# Patient Record
Sex: FEMALE | Race: WHITE | HISPANIC OR LATINO | Employment: FULL TIME | URBAN - METROPOLITAN AREA
[De-identification: names, ages, dates, MRNs, and addresses within clinical notes are randomized per-mention and may not be internally consistent; named-entity substitution may affect disease eponyms.]

---

## 2023-08-09 ENCOUNTER — OFFICE VISIT (OUTPATIENT)
Dept: FAMILY MEDICINE | Facility: CLINIC | Age: 23
End: 2023-08-09
Payer: COMMERCIAL

## 2023-08-09 VITALS
WEIGHT: 143 LBS | DIASTOLIC BLOOD PRESSURE: 82 MMHG | RESPIRATION RATE: 16 BRPM | TEMPERATURE: 98.2 F | SYSTOLIC BLOOD PRESSURE: 110 MMHG | BODY MASS INDEX: 24.41 KG/M2 | HEIGHT: 64 IN

## 2023-08-09 DIAGNOSIS — H11.31 SUBCONJUNCTIVAL HEMORRHAGE OF RIGHT EYE: Primary | ICD-10-CM

## 2023-08-09 DIAGNOSIS — H01.001 BLEPHARITIS OF RIGHT UPPER EYELID, UNSPECIFIED TYPE: ICD-10-CM

## 2023-08-09 PROCEDURE — 99203 OFFICE O/P NEW LOW 30 MIN: CPT | Performed by: NURSE PRACTITIONER

## 2023-08-09 RX ORDER — OFLOXACIN 3 MG/ML
2 SOLUTION/ DROPS OPHTHALMIC EVERY 6 HOURS
COMMUNITY
Start: 2023-08-07

## 2023-08-09 ASSESSMENT — ENCOUNTER SYMPTOMS: EYE PAIN: 1

## 2023-08-09 NOTE — PROGRESS NOTES
"  Assessment & Plan     Subconjunctival hemorrhage of right eye  Blepharitis of right upper eyelid, unspecified type  Right eye consistent with subconjunctival hemorrhage.  Unclear what had caused this.  Possibly in her sleep.  Discussed with patient this may get slightly worse before it gets better including yellowing and turning purple.  If not significantly improving or if getting a lot of pain I would recommend follow-up with an eye doctor..    The upper eyelid swelling is improving. I would continue the ofloxacin drops for infection.                 PANDA KUHN CNP  [facility]    Luisito Jon is a 23 year old, presenting for the following health issues:  Eye Problem        8/9/2023     8:45 AM   Additional Questions   Roomed by Traci CHRIS       History of Present Illness       Reason for visit:  Right eye red  Symptom onset:  Today  Symptoms include:  Itchy, watery eye,swollen  Symptom intensity:  Mild  Symptom progression:  Worsening  Had these symptoms before:  No  What makes it worse:  No  What makes it better:  No    She eats 2-3 servings of fruits and vegetables daily.She consumes 2 sweetened beverage(s) daily.She exercises with enough effort to increase her heart rate 20 to 29 minutes per day.  She exercises with enough effort to increase her heart rate 3 or less days per week.   She is taking medications regularly.               Review of Systems   Eyes:  Positive for pain.            Objective    /82 (BP Location: Right arm, Patient Position: Sitting, Cuff Size: Adult Regular)   Temp 98.2  F (36.8  C) (Oral)   Resp 16   Ht 1.613 m (5' 3.5\")   Wt 64.9 kg (143 lb)   LMP 07/13/2023 (Exact Date)   BMI 24.93 kg/m    Body mass index is 24.93 kg/m .  Physical Exam  Constitutional:       Appearance: Normal appearance.   Eyes:      Conjunctiva/sclera:      Right eye: Hemorrhage present.      Comments: Right upper eyelid swelling. Minimal amount of " ecchymosis at external corner of upper eye. No hordeolum/stye.    Neurological:      General: No focal deficit present.      Mental Status: She is alert and oriented to person, place, and time.   Psychiatric:         Mood and Affect: Mood normal.         Behavior: Behavior normal.

## 2024-06-04 NOTE — PROGRESS NOTES
ASSESSMENT & PLAN    Danay was seen today for pain.    Diagnoses and all orders for this visit:    Chronic pain of right wrist  -     XR Wrist Right G/E 3 Views; Future    Pain of wrist with palpable mass  -     MR Wrist Right w/o Contrast; Future  -     Hand Therapy Referral; Future        - bony prominence at base of second metacarpal without previous or acute trauma causing pain and tenderness with signs of impringement of right wrist. Acute on chronic. Prominence has been there for years however now has exacerbation of pain. Will get MRI to rule out cyst versus bony abnormality versus other acute tendinitis  -Use as tolerated  -If cystic will recommend aspiration and if bony can consider brace and formal hand/OT therapy to help with symptom relief.     Kell Montez [facility]    -----  Chief Complaint   Patient presents with    Right Wrist - Pain       SUBJECTIVE  Danay Ma is a/an 24 year old female who is seen as a self referral for evaluation of right wrist pain.     The patient is seen by themselves.  The patient is Right handed    Onset: acute on chronic bony prominence that because tender 1-2 month(s) ago became symptomatic, however been having bump for years.. Reports insidious onset without acute precipitating event. No known injury  Location of Pain: right wrist dorsally, near capitate  Worsened by: weightbearing, passive extension,   Better with: nothing  Treatments tried: no treatment tried to date  Associated symptoms: aching pain that is manageable. Deformity visualized on dorsal hand.   Denies numbness, tingling, locking  Orthopedic/Surgical history: NO  Social History/Occupation: works  at [employer]    There is no problem list on file for this patient.      Current Outpatient Medications   Medication Sig Dispense Refill    ofloxacin (OCUFLOX) 0.3 % ophthalmic solution Place 2 drops into the right eye every 6 hours         PMH,  Medications and Allergies were reviewed and updated as needed.    REVIEW OF SYSTEMS:  10 point ROS is negative other than symptoms noted above in HPI        OBJECTIVE:  /78   Wt 58.5 kg (129 lb)   BMI 22.49 kg/m     General: healthy, alert and in no distress  Skin: no suspicious lesions or rash.  CV: distal perfusion intact RUE  Resp: normal respiratory effort without conversational dyspnea   Psych: normal mood and affect  Gait: NORMAL  Neuro: Normal light sensory exam of right upper extremity     MSK:  RIGHT WRIST  Inspection:    Notable prominence based of second and third metacapral  Palpation:    Tender about the second metacarpal base at site of prominence. Remainder of bony and ligamentous line marks are nontender.   Range of Motion:    Restricted wrist extension   Strength:   No deficits in flexion, extension, ulnar/radial deviation, or  strength.. Normal pinch strength.  Special Tests:    Positive: None    Negative: CMC grind.         RADIOLOGY:  Final results and radiologist's interpretation, available in the [facility] health record.  Images were reviewed with the patient in the office today.    My personal interpretation of the performed imaging:     XR wrist right: 6/10/2024:  no acute fracture or dislocation. Await final radiology read                     Disclaimer: This note consists of symbols derived from keyboarding, dictation and/or voice recognition software. As a result, there may be errors in the script that have gone undetected. Please consider this when interpreting information found in this chart.

## 2024-06-10 ENCOUNTER — OFFICE VISIT (OUTPATIENT)
Dept: ORTHOPEDICS | Facility: CLINIC | Age: 24
End: 2024-06-10
Payer: COMMERCIAL

## 2024-06-10 ENCOUNTER — ANCILLARY PROCEDURE (OUTPATIENT)
Dept: GENERAL RADIOLOGY | Facility: CLINIC | Age: 24
End: 2024-06-10
Attending: STUDENT IN AN ORGANIZED HEALTH CARE EDUCATION/TRAINING PROGRAM
Payer: COMMERCIAL

## 2024-06-10 VITALS — WEIGHT: 129 LBS | SYSTOLIC BLOOD PRESSURE: 112 MMHG | BODY MASS INDEX: 22.49 KG/M2 | DIASTOLIC BLOOD PRESSURE: 78 MMHG

## 2024-06-10 DIAGNOSIS — G89.29 CHRONIC PAIN OF RIGHT WRIST: ICD-10-CM

## 2024-06-10 DIAGNOSIS — M25.531 CHRONIC PAIN OF RIGHT WRIST: ICD-10-CM

## 2024-06-10 DIAGNOSIS — M25.531 CHRONIC PAIN OF RIGHT WRIST: Primary | ICD-10-CM

## 2024-06-10 DIAGNOSIS — G89.29 CHRONIC PAIN OF RIGHT WRIST: Primary | ICD-10-CM

## 2024-06-10 DIAGNOSIS — M25.539 PAIN OF WRIST WITH PALPABLE MASS: ICD-10-CM

## 2024-06-10 DIAGNOSIS — R22.30 PAIN OF WRIST WITH PALPABLE MASS: ICD-10-CM

## 2024-06-10 PROCEDURE — 99203 OFFICE O/P NEW LOW 30 MIN: CPT | Performed by: STUDENT IN AN ORGANIZED HEALTH CARE EDUCATION/TRAINING PROGRAM

## 2024-06-10 PROCEDURE — 73110 X-RAY EXAM OF WRIST: CPT | Mod: TC | Performed by: RADIOLOGY

## 2024-06-10 NOTE — PATIENT INSTRUCTIONS
1. Chronic pain of right wrist      - hardened prominence without acute trauma causing pain and tenderness with signs of impringement of right wrist. Acute on chronic. Prominence has been there for years however now has exacerbation of pain with wrist extension. Will get MRI to rule out cyst versus bony abnormality from previous injury versus other acute tendinitis  -Use as tolerated  -If cystic will recommend aspiration and if bony can consider brace and formal hand/OT therapy to help with symptom relief.     Please call [phone]  Ask for my team if you have any questions or concerns    Kell Montez DO  [facility]      Thank you for choosing [facility]!    CLINIC LOCATIONS:     [place]  TRIAGE LINE: [phone] [address] [facility] [place] APPOINTMENTS: [phone]   [place] [address] RADIOLOGY: [phone]   (Monday, Thursday & Friday) PHYSICAL THERAPY: [phone]    BILLING QUESTIONS: [phone]   [place] FAX: [phone] [address]    [place] [address]    (Wednesday)

## 2024-06-10 NOTE — LETTER
6/10/2024      Danay Ma  [address]  [place] [address]      Dear Colleague,    Thank you for referring your patient, Danay Ma, to the [facility]. Please see a copy of my visit note below.    ASSESSMENT & PLAN    Danay was seen today for pain.    Diagnoses and all orders for this visit:    Chronic pain of right wrist  -     XR Wrist Right G/E 3 Views; Future    Pain of wrist with palpable mass  -     MR Wrist Right w/o Contrast; Future  -     Hand Therapy Referral; Future        - bony prominence at base of second metacarpal without previous or acute trauma causing pain and tenderness with signs of impringement of right wrist. Acute on chronic. Prominence has been there for years however now has exacerbation of pain. Will get MRI to rule out cyst versus bony abnormality versus other acute tendinitis  -Use as tolerated  -If cystic will recommend aspiration and if bony can consider brace and formal hand/OT therapy to help with symptom relief.     Kell Montez DO  [facility]    -----  Chief Complaint   Patient presents with     Right Wrist - Pain       SUBJECTIVE  Danay Ma is a/an 24 year old female who is seen as a self referral for evaluation of right wrist pain.     The patient is seen by themselves.  The patient is Right handed    Onset: acute on chronic bony prominence that because tender 1-2 month(s) ago became symptomatic, however been having bump for years.. Reports insidious onset without acute precipitating event. No known injury  Location of Pain: right wrist dorsally, near capitate  Worsened by: weightbearing, passive extension,   Better with: nothing  Treatments tried: no treatment tried to date  Associated symptoms: aching pain that is manageable. Deformity visualized on dorsal hand.   Denies numbness, tingling, locking  Orthopedic/Surgical history: NO  Social History/Occupation: works   at [employer]    There is no problem list on file for this patient.      Current Outpatient Medications   Medication Sig Dispense Refill     ofloxacin (OCUFLOX) 0.3 % ophthalmic solution Place 2 drops into the right eye every 6 hours         PMH, Medications and Allergies were reviewed and updated as needed.    REVIEW OF SYSTEMS:  10 point ROS is negative other than symptoms noted above in HPI        OBJECTIVE:  /78   Wt 58.5 kg (129 lb)   BMI 22.49 kg/m     General: healthy, alert and in no distress  Skin: no suspicious lesions or rash.  CV: distal perfusion intact RUE  Resp: normal respiratory effort without conversational dyspnea   Psych: normal mood and affect  Gait: NORMAL  Neuro: Normal light sensory exam of right upper extremity     MSK:  RIGHT WRIST  Inspection:    Notable prominence based of second and third metacapral  Palpation:    Tender about the second metacarpal base at site of prominence. Remainder of bony and ligamentous line marks are nontender.   Range of Motion:    Restricted wrist extension   Strength:   No deficits in flexion, extension, ulnar/radial deviation, or  strength.. Normal pinch strength.  Special Tests:    Positive: None    Negative: CMC grind.         RADIOLOGY:  Final results and radiologist's interpretation, available in the [facility] health record.  Images were reviewed with the patient in the office today.    My personal interpretation of the performed imaging:     XR wrist right: 6/10/2024:  no acute fracture or dislocation. Await final radiology read                     Disclaimer: This note consists of symbols derived from keyboarding, dictation and/or voice recognition software. As a result, there may be errors in the script that have gone undetected. Please consider this when interpreting information found in this chart.       Again, thank you for allowing me to participate in the care of your patient.        Sincerely,        Kell Montez, DO

## 2024-06-19 ENCOUNTER — THERAPY VISIT (OUTPATIENT)
Dept: OCCUPATIONAL THERAPY | Facility: REHABILITATION | Age: 24
End: 2024-06-19
Attending: STUDENT IN AN ORGANIZED HEALTH CARE EDUCATION/TRAINING PROGRAM
Payer: COMMERCIAL

## 2024-06-19 DIAGNOSIS — R22.30 PAIN OF WRIST WITH PALPABLE MASS: ICD-10-CM

## 2024-06-19 DIAGNOSIS — M25.531 RIGHT WRIST PAIN: Primary | ICD-10-CM

## 2024-06-19 DIAGNOSIS — M25.539 PAIN OF WRIST WITH PALPABLE MASS: ICD-10-CM

## 2024-06-19 PROCEDURE — 97530 THERAPEUTIC ACTIVITIES: CPT | Mod: GO | Performed by: OCCUPATIONAL THERAPIST

## 2024-06-19 PROCEDURE — 97535 SELF CARE MNGMENT TRAINING: CPT | Mod: GO | Performed by: OCCUPATIONAL THERAPIST

## 2024-06-19 PROCEDURE — 97165 OT EVAL LOW COMPLEX 30 MIN: CPT | Mod: GO | Performed by: OCCUPATIONAL THERAPIST

## 2024-06-19 NOTE — PROGRESS NOTES
"OCCUPATIONAL THERAPY EVALUATION  Type of Visit: Evaluation             Subjective      Presenting condition or subjective complaint: right wrist discomfort  Date of onset: 06/10/24 (Referral)    Relevant medical history: Depression   Dates & types of surgery:      Patient comes to therapy today for evaluation and treatment of right-sided wrist pain.  She complains of a hard nodule/mass to the dorsum of her right hand and wrist that has been present since birth.  It is firm and not necessarily mobile or tender to the touch, though her extensor tendon will roll over it and this has been growing more uncomfortable over time.  She does endorse mild discomfort with heavy weightbearing on the extended wrist, as well.  She only complains of pain at nighttime if she \"sleeps on it wrong.\"  Imaging including ultrasound and x-ray were inconclusive, has MRI scheduled for July 12.  Thus far she has only tried rest with some relief.    Per Dr. Montez 6/10/2024: Bony prominence at base of second metacarpal without previous or acute trauma causing pain and tenderness with signs of impringement of right wrist. Acute on chronic. Prominence has been there for years however now has exacerbation of pain. Will get MRI to rule out cyst versus bony abnormality versus other acute tendinitis  -Use as tolerated  -If cystic will recommend aspiration and if bony can consider brace and formal hand/OT therapy to help with symptom relief.     Prior diagnostic imaging/testing results: MRI; X-ray     Prior therapy history for the same diagnosis, illness or injury: No      Prior Level of Function  Transfers: Independent  Ambulation: Independent  ADL: Independent  IADL: Driving, Finances, Housekeeping, Laundry, Meal preparation, Medication management, Work, Yard work    Living Environment  Social support: With a significant other or spouse   Type of home: Apartment/condo   Stairs to enter the home: No       Ramp: No   Stairs inside the home: No     "   Help at home:    Equipment owned:       Employment:      Hobbies/Interests:      Patient goals for therapy:         Objective   ADDITIONAL HISTORY:  Right hand dominant  Patient reports symptoms of pain, stiffness/loss of motion, and weakness/loss of strength  Transportation: drives  Currently working in normal job without restrictions    Functional Outcome Measure:   Upper Extremity Functional Index Score:  SCORE:   Column Totals: /80: 74   (A lower score indicates greater disability.)    PAIN:  Pain Level at Rest: 0/10  Pain Level with Use: 6/10  Pain Location: Right dorsal wrist, just distal to the lunate/capitate between the second and third metacarpals.   Pain Quality: Aching  Pain Frequency: intermittent  Pain is Worst: daytime  Pain is Exacerbated By: Keyboarding, weightbearing on the extended wrist  Pain is Relieved By: rest  Pain Progression: Unchanged    POSTURE: Normal     EDEMA:  A firm mass is palpable to the dorsum of the right hand, between the second and third metacarpals, just distal to the lunate and capitate. 0.5 cm in diameter. The EDC/EIP are noted to roll over it primarily with active radial/ulnar deviation of the wrist. Does not appear fluid filled and is not trans-luminescent.        SENSATION: WNL throughout all nerve distributions; per patient report     ROM:  AROM of the bilateral elbow, FA, wrist, thumb and digits are WFL and equivocal bilaterally. Mild tenderness at end range flexion and extension to the right.     SPECIAL TESTS:   Central Dorsal Zone Left Right   Finger Extension Test (SL--in wrist flex, resist long ext) Negative Negative   Linscheid Test (CMC joints--stabilize distal MC, mob CMC) Negative Negative   Ballottement Scaphoid on Lunate Negative Negative       RESISTED TESTING:  Non-tender with resisted independent finger extension, composite digital extension, wrist flexion and extension bilaterally.       STRENGTH:  NT today.     PALPATION:  TTP to the mass about the  dorsum of the right hand, otherwise negative.     Assessment & Plan   CLINICAL IMPRESSIONS  Medical Diagnosis: Right wrist pain with palpable mass    Treatment Diagnosis: Right wrist pain with palpable mass, right wrist pain    Impression/Assessment: Pt is a 24 year old female presenting to Occupational Therapy due to right wrist pain.  The following significant findings have been identified: Impaired activity tolerance and Pain.  These identified deficits interfere with their ability to perform self care tasks, work tasks, recreational activities, household chores, driving , and  yard work as compared to previous level of function.   Patient's limitations or Problem List includes: Pain, Decreased ROM/motion, and Tightness in musculature of the right wrist, hand, index finger, and long finger which interferes with the patient's ability to perform Self Care Tasks (dressing), Work Tasks, Sleep Patterns, Recreational Activities, Household Chores, and Driving  as compared to previous level of function.    Clinical Decision Making (Complexity):  Assessment of Occupational Performance: 1-3 Performance Deficits  Occupational Performance Limitations: driving and community mobility, health management and maintenance, home establishment and management, meal preparation and cleanup, sleep, work, leisure activities, and social participation  Clinical Decision Making (Complexity): Low complexity    PLAN OF CARE  Treatment Interventions:  Therapeutic Exercise:  AROM, AAROM, PROM, Tendon Gliding, Blocking, Reverse Blocking, Place and Hold, Contract Relax, Extensor Tracking, Isotonics, Isometrics, and Stabilization  Neuromuscular re-education:  Nerve Gliding, Coordination/Dexterity, Kinesthetic Training, Proprioceptive Training, Posture, Kinesiotaping, Strain Counter Strain, Isometrics, and Stabilization  Manual Techniques:  Coordination/Dexterity, Joint mobilization, Friction massage, Myofascial release, and Manual edema  mobilization  Orthotic Fabrication:  Static, Finger based, Hand based, and Forearm based  Self Care:  Self Care Tasks, Ergonomic Considerations, and Work Tasks    Long Term Goals   OT Goal 1  Goal Identifier: Goal I  Goal Description: Patient will complete routine ADL, IADL and work-related duties with little to no pain and/or difficulty utilizing AE and activity modification principles as needed. (0-1/10)  Rationale: In order to maximize safety and independence with performance of self-care activities;In order to maximize safety and independence with ADL/IADLs  Goal Progress: New  Target Date: 08/14/24      Frequency of Treatment: 1x/month  Duration of Treatment: 2 months     Recommended Referrals to Other Professionals:     Education Assessment: Learner/Method: Patient;No Barriers to Learning;Pictures/Video;Demonstration;Reading;Listening     Risks and benefits of evaluation/treatment have been explained.   Patient/Family/caregiver agrees with Plan of Care.     Evaluation Time:    OT Eval, Low Complexity Minutes (39656): 20    Signing Clinician: Gilberto Arteaga OT

## 2024-07-12 ENCOUNTER — HOSPITAL ENCOUNTER (OUTPATIENT)
Dept: MRI IMAGING | Facility: CLINIC | Age: 24
Discharge: HOME | End: 2024-07-12
Attending: STUDENT IN AN ORGANIZED HEALTH CARE EDUCATION/TRAINING PROGRAM | Admitting: STUDENT IN AN ORGANIZED HEALTH CARE EDUCATION/TRAINING PROGRAM
Payer: COMMERCIAL

## 2024-07-12 DIAGNOSIS — M25.539 PAIN OF WRIST WITH PALPABLE MASS: ICD-10-CM

## 2024-07-12 DIAGNOSIS — R22.30 PAIN OF WRIST WITH PALPABLE MASS: ICD-10-CM

## 2024-07-12 PROCEDURE — 73221 MRI JOINT UPR EXTREM W/O DYE: CPT | Mod: RT

## 2024-07-15 ENCOUNTER — TELEPHONE (OUTPATIENT)
Dept: ORTHOPEDICS | Facility: CLINIC | Age: 24
End: 2024-07-15
Payer: COMMERCIAL

## 2024-07-15 DIAGNOSIS — M25.731 CARPAL BOSS OF RIGHT WRIST: Primary | ICD-10-CM

## 2024-07-15 NOTE — TELEPHONE ENCOUNTER
Discussed MRI results with patient regarding carpal boss seen on MRI. Discussed options. Will try to limit use with modifications. Ideally would like to consider excision so will refer to hand surgery as has been acute on chronic in nature. Placed referral.

## 2024-08-07 ENCOUNTER — TELEPHONE (OUTPATIENT)
Dept: ORTHOPEDICS | Facility: CLINIC | Age: 24
End: 2024-08-07
Payer: COMMERCIAL

## 2024-08-07 DIAGNOSIS — G89.29 CHRONIC PAIN OF RIGHT WRIST: ICD-10-CM

## 2024-08-07 DIAGNOSIS — M25.531 CHRONIC PAIN OF RIGHT WRIST: ICD-10-CM

## 2024-08-07 DIAGNOSIS — M25.731 CARPAL BOSS OF RIGHT WRIST: Primary | ICD-10-CM

## 2024-08-07 NOTE — TELEPHONE ENCOUNTER
Patient comes into clinic today requesting a wrist brace for her wrist as previously discussed at last office visit.  Patient was given in clinic a wrist brace today.  Will send orders for cosign to provider.  Kenji So ATC

## 2024-10-06 ENCOUNTER — HEALTH MAINTENANCE LETTER (OUTPATIENT)
Age: 24
End: 2024-10-06

## 2024-10-10 NOTE — PROGRESS NOTES
Orthopaedic Surgery Hand and Upper Extremity New Clinic Note:  Date: Oct 11, 2024     Visit Provider: Inderjit Brian MD  Patient ID:[id number]    Chief Complaint: Right wrist mass    Dominant Hand: Right    Occupation: Healthcare /reception      HPI:  Ms. Danay Ma is a 24 year old female right hand dominant who presents with right wrist carpal boss.  The mass has been present for years, but only recently started to become painful about 5-6 months ago.  She notices that the extensor tendons to the index finger roll over the boss and become inflamed and painful.  There is visible snapping of the tendons over the carpal boss.  There is pain with wrist extension especially while working at the computer all day    Symptom Onset: 5-6 months ago  Trauma/Inciting Event: none  Location of pain/symptoms: right wrist, dorsal  Duration (constant/Intermittent, etc): intermittent, wrist extension/flexion  Characteristics of pain (sharp, dull, etc): dull, ache  Aggravating factors: sleeping without brace, use  Prior Treatment: bracing at night  Injections (date): none  EMG (for carpal tunnel, etc): no    PMH  Diabetes: no  Thyroid Problems: no  Smoking Y/N: no         PAST MEDICAL HISTORY:  No past medical history on file.       PAST SURGICAL HISTORY:  No past surgical history on file.       MEDICATIONS:       ALLERGIES:      Allergies   Allergen Reactions    Penicillins Hives    Sulfa Antibiotics Hives            FAMILY HISTORY:  No pertinent family history     SOCIAL HISTORY:    Social Connections: Not on file        The patient's past medical, family, and social history was reviewed and confirmed.     REVIEW OF SYMPTOMS:        General: Negative   Eyes: Negative   Ear, Nose and Throat: Negative   Respiratory: Negative   Cardiovascular: Negative   Gastrointestinal: Negative   Genito-urinary: Negative   Musculoskeletal: see HPI  Neurological: Negative   Psychological: Negative  HEME: Negative   ENDO: Negative   SKIN:  "Negative     VITALS:  /68   Ht 1.613 m (5' 3.5\")   Wt 58.5 kg (129 lb)   BMI 22.49 kg/m          EXAM:  General: NAD, A&Ox3  HEENT: NC/AT  CV: RRR by peripheral pulse  Pulmonary: Non-labored breathing on RA     Right upper Extremity:     Inspection:  There is no evidence of open wounds.   There is no evidence of erythema or infection  There is a visible mass at the base of the middle metacarpal  There is visible snapping of the extensor tendons over the carpal boss on radial and ulnar deviation    Palpation:  There is no palpable fluctuance  1 cm x 1 cm firm immobile mass over the base of the third metacarpal     Motor:  Flexing and extending all fingers without difficulty     Sensory:  Intact to light touch in the median, radial, and ulnar nerve distributions           IMAGING:    AP oblique and lateral x-ray demonstrate a carpal boss over the third metacarpal    There is also an MRI that demonstrates synovitis of the extensor tendons including the EDC and EIP over the level of the third metacarpal boss.    I have personally reviewed the above images and labs.        IMPRESSION AND RECOMMENDATIONS:    Danay Ma is a 24 year old year old female with middle finger metacarpal boss    I discussed the natural history and course of this condition with the patient.  I explained to her that this is a benign mass caused by a slight overgrowth of the base of the metacarpal.  They become symptomatic when they come large enough that the extensor tendon sublux over them.  She states that she has never had any treatment before but she is seeking a more permanent solution to this problem.  I explained that I agree that the problem is likely just due to the significant size of the mass and the subluxation of the tendons over the mass.  I explained to her the operative and nonoperative management options.  Nonoperative management options include bracing her wrist injections.  Operative management would include carpal " boss excision.  Her explained to her the possibility of regrowth of the boss as well as the possibility for extensor tendon rupture after surgery like this if there is any sort of bony overgrowth or sharp edges.  The patient was offered the opportunity to ask questions which were answered to her satisfaction.  A shared decision was made to pursue operative management including carpal boss excision.  She will be scheduled for her earliest convenience    Problem List Items Addressed This Visit    None  Visit Diagnoses       Carpal boss of right wrist    -  Primary    Relevant Orders    Case Request: EXCISION, BOSS, CARPAL; RIGHT MIDDLE (Completed)             Orders Placed During This Visit:    Orders Placed This Encounter   Procedures    Case Request: EXCISION, BOSS, CARPAL; RIGHT MIDDLE        Inderjit Brian MD    Hand, Upper Extremity & Microvascular Surgery  Department of Orthopaedic Surgery  [facility]

## 2024-10-11 ENCOUNTER — OFFICE VISIT (OUTPATIENT)
Dept: ORTHOPEDICS | Facility: CLINIC | Age: 24
End: 2024-10-11
Payer: COMMERCIAL

## 2024-10-11 VITALS
WEIGHT: 129 LBS | BODY MASS INDEX: 22.02 KG/M2 | SYSTOLIC BLOOD PRESSURE: 112 MMHG | DIASTOLIC BLOOD PRESSURE: 68 MMHG | HEIGHT: 64 IN

## 2024-10-11 DIAGNOSIS — M25.731 CARPAL BOSS OF RIGHT WRIST: Primary | ICD-10-CM

## 2024-10-11 PROCEDURE — 99203 OFFICE O/P NEW LOW 30 MIN: CPT | Performed by: STUDENT IN AN ORGANIZED HEALTH CARE EDUCATION/TRAINING PROGRAM

## 2024-10-11 NOTE — LETTER
10/11/2024      Danay Ma  [address]  [facility] [address]      Dear Colleague,    Thank you for referring your patient, Danay Ma, to the [facility]. Please see a copy of my visit note below.    Orthopaedic Surgery Hand and Upper Extremity New Clinic Note:  Date: Oct 11, 2024     Visit Provider: Inderjit Brian MD  Patient ID:[id number]    Chief Complaint: Right wrist mass    Dominant Hand: Right    Occupation: Healthcare /reception      HPI:  Ms. Danay Ma is a 24 year old female right hand dominant who presents with right wrist carpal boss.  The mass has been present for years, but only recently started to become painful about 5-6 months ago.  She notices that the extensor tendons to the index finger roll over the boss and become inflamed and painful.  There is visible snapping of the tendons over the carpal boss.  There is pain with wrist extension especially while working at the computer all day    Symptom Onset: 5-6 months ago  Trauma/Inciting Event: none  Location of pain/symptoms: right wrist, dorsal  Duration (constant/Intermittent, etc): intermittent, wrist extension/flexion  Characteristics of pain (sharp, dull, etc): dull, ache  Aggravating factors: sleeping without brace, use  Prior Treatment: bracing at night  Injections (date): none  EMG (for carpal tunnel, etc): no    PMH  Diabetes: no  Thyroid Problems: no  Smoking Y/N: no         PAST MEDICAL HISTORY:  No past medical history on file.       PAST SURGICAL HISTORY:  No past surgical history on file.       MEDICATIONS:       ALLERGIES:      Allergies   Allergen Reactions     Penicillins Hives     Sulfa Antibiotics Hives            FAMILY HISTORY:  No pertinent family history     SOCIAL HISTORY:    Social Connections: Not on file        The patient's past medical, family, and social history was reviewed and confirmed.     REVIEW OF SYMPTOMS:        General: Negative   Eyes: Negative   Ear, Nose  "and Throat: Negative   Respiratory: Negative   Cardiovascular: Negative   Gastrointestinal: Negative   Genito-urinary: Negative   Musculoskeletal: see HPI  Neurological: Negative   Psychological: Negative  HEME: Negative   ENDO: Negative   SKIN: Negative     VITALS:  /68   Ht 1.613 m (5' 3.5\")   Wt 58.5 kg (129 lb)   BMI 22.49 kg/m          EXAM:  General: NAD, A&Ox3  HEENT: NC/AT  CV: RRR by peripheral pulse  Pulmonary: Non-labored breathing on RA     Right upper Extremity:     Inspection:  There is no evidence of open wounds.   There is no evidence of erythema or infection  There is a visible mass at the base of the middle metacarpal  There is visible snapping of the extensor tendons over the carpal boss on radial and ulnar deviation    Palpation:  There is no palpable fluctuance  1 cm x 1 cm firm immobile mass over the base of the third metacarpal     Motor:  Flexing and extending all fingers without difficulty     Sensory:  Intact to light touch in the median, radial, and ulnar nerve distributions           IMAGING:    AP oblique and lateral x-ray demonstrate a carpal boss over the third metacarpal    There is also an MRI that demonstrates synovitis of the extensor tendons including the EDC and EIP over the level of the third metacarpal boss.    I have personally reviewed the above images and labs.        IMPRESSION AND RECOMMENDATIONS:    Danay Ma is a 24 year old year old female with middle finger metacarpal boss    I discussed the natural history and course of this condition with the patient.  I explained to her that this is a benign mass caused by a slight overgrowth of the base of the metacarpal.  They become symptomatic when they come large enough that the extensor tendon sublux over them.  She states that she has never had any treatment before but she is seeking a more permanent solution to this problem.  I explained that I agree that the problem is likely just due to the significant size of " the mass and the subluxation of the tendons over the mass.  I explained to her the operative and nonoperative management options.  Nonoperative management options include bracing her wrist injections.  Operative management would include carpal boss excision.  Her explained to her the possibility of regrowth of the boss as well as the possibility for extensor tendon rupture after surgery like this if there is any sort of bony overgrowth or sharp edges.  The patient was offered the opportunity to ask questions which were answered to her satisfaction.  A shared decision was made to pursue operative management including carpal boss excision.  She will be scheduled for her earliest convenience    Problem List Items Addressed This Visit    None  Visit Diagnoses       Carpal boss of right wrist    -  Primary    Relevant Orders    Case Request: EXCISION, BOSS, CARPAL; RIGHT MIDDLE (Completed)             Orders Placed During This Visit:    Orders Placed This Encounter   Procedures     Case Request: EXCISION, BOSS, CARPAL; RIGHT MIDDLE        Inderjit Brian MD    Hand, Upper Extremity & Microvascular Surgery  Department of Orthopaedic Surgery  [facility]         Again, thank you for allowing me to participate in the care of your patient.        Sincerely,        Inderjit Brian MD

## 2024-12-10 ENCOUNTER — LAB (OUTPATIENT)
Dept: LAB | Facility: CLINIC | Age: 24
End: 2024-12-10

## 2024-12-10 DIAGNOSIS — N92.6 MISSED PERIOD: Primary | ICD-10-CM

## 2024-12-10 DIAGNOSIS — N92.6 MISSED PERIOD: ICD-10-CM

## 2024-12-10 LAB — HCG INTACT+B SERPL-ACNC: <1 MIU/ML

## 2024-12-10 PROCEDURE — 84702 CHORIONIC GONADOTROPIN TEST: CPT

## 2024-12-10 PROCEDURE — 36415 COLL VENOUS BLD VENIPUNCTURE: CPT

## 2024-12-19 PROBLEM — M25.539 PAIN OF WRIST WITH PALPABLE MASS: Status: RESOLVED | Noted: 2024-06-19 | Resolved: 2024-12-19

## 2024-12-19 PROBLEM — M25.531 RIGHT WRIST PAIN: Status: RESOLVED | Noted: 2024-06-19 | Resolved: 2024-12-19

## 2024-12-19 PROBLEM — R22.30 PAIN OF WRIST WITH PALPABLE MASS: Status: RESOLVED | Noted: 2024-06-19 | Resolved: 2024-12-19

## 2024-12-31 ENCOUNTER — TELEPHONE (OUTPATIENT)
Dept: ORTHOPEDICS | Facility: CLINIC | Age: 24
End: 2024-12-31

## 2024-12-31 NOTE — TELEPHONE ENCOUNTER
Teaching Flowsheet   Relevant Diagnosis: Carpal boss of right wrist [M25.731]  - Primary   Teaching Topic: 1/27 EXCISION, BOSS, CARPAL; RIGHT MIDDLE with Dr. Brian at [facility] [place].      Hand therapy apt: no  MAC with Block    SILVANA 1/20 at [facility]    Person(s) involved in teaching:   Patient     Motivation Level:  Asks Questions: Yes  Eager to Learn: Yes  Cooperative: Yes  Receptive (willing/able to accept information): Yes  Any cultural factors/Scientology beliefs that may influence understanding or compliance? No     Patient demonstrates understanding of the following:  Reason for the appointment, diagnosis and treatment plan: Yes  Knowledge of proper use of medications and conditions for which they are ordered (with special attention to potential side effects or drug interactions): Yes  Which situations necessitate calling provider and whom to contact: Yes     Teaching Concerns Addressed: She will have [facility] fax in the H&P to the [phone].  She requested that information be sent to her in a My Chart message.  That was sent with the [facility] brochure.       Proper use and care of  (medical equip, care aids, etc.): Yes  Nutritional needs and diet plan: Yes  Pain management techniques: Yes  Wound Care: Yes  How and/when to access community resources: Yes     Instructional Materials Used/Given: Reviewed same-day surgery instructions with patient (NPO, showering, stoplight tool, need for pre-op H&P within 30 days of procedure, and responsible adult /person to stay with patient for 24 hours post surgery)

## 2024-12-31 NOTE — TELEPHONE ENCOUNTER
Other: Patient would like a call back, she missed a recent call from Hemalatha Avila.     Could we send this information to you in [employer] or would you prefer to receive a phone call?:   Patient would prefer a phone call   Okay to leave a detailed message?: Yes at Cell number on file:    Telephone Information:   Mobile [phone]

## 2025-01-22 ENCOUNTER — MYC MEDICAL ADVICE (OUTPATIENT)
Dept: ORTHOPEDICS | Facility: CLINIC | Age: 25
End: 2025-01-22
Payer: COMMERCIAL

## 2025-01-23 NOTE — PROGRESS NOTES
Orthopaedic Surgery Hand and Upper Extremity Follow Up Clinic Note:  Date: Feb 5, 2025     Visit Provider: nIderjit Brian MD  Patient ID:[id number]    Procedure Performed: Carpal boss excision, Right    Date of Surgery: 1/27/2025    Subjective:  Danay Ma is a 24 year old female who returns 9 days after the above procedure. She is doing well, her index finger is slightly tender with movement, otherwise no complaints.  She has been taking Tylenol and ibuprofen for pain.  Denies any numbness or tingling.      Objective:    /72   Wt 58.5 kg (129 lb)   BMI 22.49 kg/m      General: alert, appropriate, NAD  HEENT: NC/AT  CV: RRR by pulse  Pulm: Unlabored on RA  MSK:  Right hand  Incision clean, dry, intact no erythema or fluctuance  Diffuse ecchymosis to the dorsum of the hand  Able to achieve full extension of all fingers except the index which has a 20 degree lag limited by pain  Unable to achieve full composite flexion, 2 cm from palm due to pain over the dorsum of the hand, able to passively flex the fingers into a fist  Sensation intact median, radial, ulnar nerve distributions in the radial and ulnar aspects of all digits  Motor grossly intact median, radial, ulnar nerves  Capillary beds warm and well-perfused  No evidence of extensor tendon subluxation during extension of the fingers      Imaging:    None today    Assessment/Plan:  9 days status post right metacarpal boss excision    Danay Ma is a 24-year-old female who presents today for her first postoperative visit after the above.  I discussed with her my clinical findings today and reviewed the procedure that was performed.  I showed her her fluoroscopic images from the day of the procedure demonstrated that the metacarpal boss and been excised.  At this time she has some limitations in full extension of flexion of the index finger due to some tendon irritation. I discussed with there that I believe that a course of hand therapy would likely be  helpful for her.  I would like her to work on active and passive range of motion of all the fingers so that she can return to normal activities.  For the time being she will remain out of work and nonweightbearing.  I would like to see her in 1 month for repeat clinical evaluation.     Inderjit Brian MD    Hand, Upper Extremity & Microvascular Surgery  Department of Orthopaedic Surgery  [facility]

## 2025-01-27 ENCOUNTER — DOCUMENTATION ONLY (OUTPATIENT)
Dept: OTHER | Facility: CLINIC | Age: 25
End: 2025-01-27
Payer: COMMERCIAL

## 2025-01-27 ENCOUNTER — LAB REQUISITION (OUTPATIENT)
Dept: LAB | Facility: CLINIC | Age: 25
End: 2025-01-27
Payer: COMMERCIAL

## 2025-01-27 DIAGNOSIS — M25.749 METACARPAL BOSS: Primary | ICD-10-CM

## 2025-01-27 PROCEDURE — 99207 PR NO CHARGE LOS: CPT | Performed by: STUDENT IN AN ORGANIZED HEALTH CARE EDUCATION/TRAINING PROGRAM

## 2025-01-27 PROCEDURE — 88304 TISSUE EXAM BY PATHOLOGIST: CPT | Mod: TC,ORL | Performed by: STUDENT IN AN ORGANIZED HEALTH CARE EDUCATION/TRAINING PROGRAM

## 2025-01-27 RX ORDER — OXYCODONE HYDROCHLORIDE 5 MG/1
5 TABLET ORAL EVERY 6 HOURS PRN
Qty: 5 TABLET | Refills: 0 | Status: SHIPPED | OUTPATIENT
Start: 2025-01-27

## 2025-01-27 NOTE — PROGRESS NOTES
Operative Report     Date of Procedure: 2025     Location: [facility]     Name:Danay Ma  : 2000  MRN: [id number]       Preoperative Diagnosis: Right Long Finger Metacarpal Boss    Postoperative Diagnosis: Same       Procedure(s) Performed: Excision of right long finger metacarpal boss (33445)    Implants: None    Surgeon(s): Inderjit Brian MD-Primary; Juan Reyes PA-C     Procedure Summary  Anesthesia: MAC and regional  Estimated Blood Loss: 2 cc  Total IV Fluids: see anesthesia record mL       Procedural Indications: This is a 24 year old female who is having surgery for a symptomatic right long finger metacarpal boss that has failed uncooperative management including hand therapy. This patient is young and active and has symptomatic subluxation of the extensor tendon over the mass during radial and ulnar deviation. The surgical option of boss excision was explained and relative risks and benefits were explained. Given the failure of nonoperative measures including dedicated hand therapy a shared decision was made to pursue operative excision.  Risks of surgery include not limited to infection, bleeding, damage to surrounding neurovascular structures, stiffness, failure to completely excise the boss, persistent pain, persistent tendon subluxation, recurrence of the boss, need for further surgery were reviewed.  The patient wished to proceed.         Procedure Details: The patient was seen in the preoperative area. The risks, benefits, complications, treatment options, non-operative alternatives, expected recovery and outcomes were discussed with the patient. The possibilities of reaction to medication, pulmonary aspiration, injury to surrounding structures, bleeding, recurrent infection, the need for additional procedures, failure to diagnose a condition, and creating a complication requiring transfusion or operation were discussed with the patient. The patient concurred with the proposed plan,  giving informed consent.  The site of surgery was properly noted/marked if necessary per policy. The patient has been actively warmed in preoperative area. Preoperative antibiotics were ordered and given within 1 hours of incision. Venous thrombosis prophylaxis have been ordered including bilateral sequential compression devices.     The patient was brought into the operating room and the patient was placed in the supine position. Adequate anesthesia was provided. The right upper extremity was prepped and draped in sterile fashion. The location of the boss was confirmed with fluoroscopy prior to the exsanguination.     The limb was lightly exsanguinated using an Esmarch and forearm tourniquet elevated to 230 mmHg.      A transverse incision over the base of the long finger metacarpal was planned. Skin and subcutaneous tissues were incised with a scalpel. Longitudinal structures including veins and nerves were mobilized and retracted from the surgical field. Meticulous hemostasis was maintained with bipolar electrocautery.    The extensor digitorum communis tendon was identified and retracted. The ECRB tendon was identified inserting just distal to the prominence. This was retracted radially.    There was a small ganglion that arose from the CMC joint. It was mobilized and excised with its capsular attachments with bipolar electrocautery. The ganglion was sent for permanent pathology.    A longitudinal capsulotomy of the CMC joint was perfomed and subperisoteal flaps were raised. The dorsal prominence of the boss was immediately appreciated.    The dorsal prominence was excised with a small straight osteotome. Rough edges where smoothed with a rongeur and bone rasp.    The surgical area was palpated through the skin to ensure that all prominences were successfully excised. The hand was taken through radial and ulnar deviation and the tendons were stable.     The cut edges of the bone were covered in bone wax.    The  periosteal/capsular flap of the CMC was closed with buried 3-0 monocryl sutures.     A final fluoroscopic image was taken confirming that the boss had been excised.    The subcutaneous tissues were closed with buried 4-0 monocryl sutures.    The skin was closed with dermabond.     All sponge and sharps counts were correct at the end of the case.     The patient was placed into a volar forearm based plaster wrist splint with the wrist in slight extension.    She was taken to the PACU in stable condition.      Complications:  None; patient tolerated the procedure well.     Disposition: PACU - hemodynamically stable.  Condition: stable     A physicians assistant was required in this case to assist with retraction, protection of vital structures, cauterization of crossing vessels, and/or suctioning of fluids from the surgical field. A fully qualified resident/fellow at this teaching institution was not available to assist with this case.

## 2025-01-27 NOTE — PROGRESS NOTES
Postoperative Instructions - BONE FIXATION PROTOCOL  Dr. Inderjit Brian    Care of incision:    Keep bandages and splint clean and dry.  When you shower, wrap the hand and elbow in a plastic bag.  Use tape to seal off water from leaking in.  Shower with your arm above shoulder level.      Care of swelling:    Keep your operative hand elevated: hand above elbow, elbow above the heart. If you had regional anesthesia (a nerve block), wear the sling until the block wears off. You can use the sling when you are walking around, but keep you non-immobilized joints (elbow and shoulder) moving every 2-3 hours.  Sleep on your back with your hand and elbow on top of your body on a pillow.  Apply ice in a ziplock ba min on and 20 min off to those parts that are least covered with bandages.  Move the fingers that are not immobilized as much as you can to prevent scarring of nerves and tendons and to decrease swelling.  Keeping swelling to a minimum will help you control pain and will allow you to move your fingers easier.      Care of pain:      Do not play a  catch up  game with pain. If you had regional anesthesia (a nerve block), begin the following protocol before the block has completely worn off.  Take Tylenol and Ibuprofen (Motrin/Advil) or an equivalent around the clock for the first 1-3 days.  It is much easier to anticipate pain and treat it, rather than treat it after it has appeared. Take Tylenol 650mg every 6 hours (never more than 3000mg in one day). If you do not have kidney or stomach disease, take Ibuprofen (Motrin/Advil) 400mg every 6 hours. Alternate and stagger these medications so you are taking something every 3 hours. For example, take tylenol then three hours later take ibuprofen. Three hours after that, take tylenol again and repeat around the clock except while sleeping.      If prescribed, take the opioid medication every 4-6 hours as needed, if needed. Do not drink alcohol, drink, or operate heavy  machinery while taking any opioid pain medication.     Things to watch out for:    Complications from these procedures are rare, but they do happen.  If you have any unusual pain, fever, redness, drainage from the wound, bleeding, bluishness of the fingers, other than normal mild bruising of skin, do not wait until your first post-operative visit - make a phone call to our office and speak to a professional as soon as you suspect that something is wrong.  WE ARE ALWAYS AVAILABLE TO ANSWER YOUR QUESTIONS. It is common to experience bruising or discoloration in other parts of the hand, wrist, forearm, and elbow after surgery.     First postoperative visit:    Your first postoperative visit should be on within 14 days following surgery, unless otherwise specified.  Please call the office, where you were initially seen, to make an appointment.  Your postoperative instructions, need for therapy, care of the operated extremity will be discussed in detail with you.      First year:    Expect your condition to improve for about 1 year after the surgery.  There is usually significant improvement in the first 2 months, and then a slow, steady improvement for the rest of the year.  Protect the scar from sunlight with SPF 50 for 1 year, so that it does not develop a permanent  sunburn  appearance. After the skin has healed, you may begin to put Vitamin E ointment on the scar to help minimize the appearance.

## 2025-01-28 LAB
PATH REPORT.COMMENTS IMP SPEC: NORMAL
PATH REPORT.FINAL DX SPEC: NORMAL
PATH REPORT.GROSS SPEC: NORMAL
PATH REPORT.MICROSCOPIC SPEC OTHER STN: NORMAL
PATH REPORT.RELEVANT HX SPEC: NORMAL
PHOTO IMAGE: NORMAL

## 2025-01-28 PROCEDURE — 88304 TISSUE EXAM BY PATHOLOGIST: CPT | Mod: 26 | Performed by: PATHOLOGY

## 2025-02-05 ENCOUNTER — OFFICE VISIT (OUTPATIENT)
Dept: ORTHOPEDICS | Facility: CLINIC | Age: 25
End: 2025-02-05
Payer: COMMERCIAL

## 2025-02-05 VITALS — WEIGHT: 129 LBS | DIASTOLIC BLOOD PRESSURE: 72 MMHG | BODY MASS INDEX: 22.49 KG/M2 | SYSTOLIC BLOOD PRESSURE: 100 MMHG

## 2025-02-05 DIAGNOSIS — M25.749 METACARPAL BOSS: Primary | ICD-10-CM

## 2025-02-05 PROCEDURE — 99024 POSTOP FOLLOW-UP VISIT: CPT | Performed by: STUDENT IN AN ORGANIZED HEALTH CARE EDUCATION/TRAINING PROGRAM

## 2025-02-05 NOTE — LETTER
2/5/2025      Danay Ma  [address]  [facility] [address]      Dear Colleague,    Thank you for referring your patient, Danay Ma, to the [facility] [place]. Please see a copy of my visit note below.    Orthopaedic Surgery Hand and Upper Extremity Follow Up Clinic Note:  Date: Feb 5, 2025     Visit Provider: Inderjit Brian MD  Patient ID:[id number]    Procedure Performed: Carpal boss excision, Right    Date of Surgery: 1/27/2025    Subjective:  Danay Ma is a 24 year old female who returns 9 days after the above procedure. She is doing well, her index finger is slightly tender with movement, otherwise no complaints.  She has been taking Tylenol and ibuprofen for pain.  Denies any numbness or tingling.      Objective:    /72   Wt 58.5 kg (129 lb)   BMI 22.49 kg/m      General: alert, appropriate, NAD  HEENT: NC/AT  CV: RRR by pulse  Pulm: Unlabored on RA  MSK:  Right hand  Incision clean, dry, intact no erythema or fluctuance  Diffuse ecchymosis to the dorsum of the hand  Able to achieve full extension of all fingers except the index which has a 20 degree lag limited by pain  Unable to achieve full composite flexion, 2 cm from palm due to pain over the dorsum of the hand, able to passively flex the fingers into a fist  Sensation intact median, radial, ulnar nerve distributions in the radial and ulnar aspects of all digits  Motor grossly intact median, radial, ulnar nerves  Capillary beds warm and well-perfused  No evidence of extensor tendon subluxation during extension of the fingers      Imaging:    None today    Assessment/Plan:  9 days status post right metacarpal boss excision    Danay Ma is a 24-year-old female who presents today for her first postoperative visit after the above.  I discussed with her my clinical findings today and reviewed the procedure that was performed.  I showed her her fluoroscopic images from the day of the procedure demonstrated  that the metacarpal boss and been excised.  At this time she has some limitations in full extension of flexion of the index finger due to some tendon irritation. I discussed with there that I believe that a course of hand therapy would likely be helpful for her.  I would like her to work on active and passive range of motion of all the fingers so that she can return to normal activities.  For the time being she will remain out of work and nonweightbearing.  I would like to see her in 1 month for repeat clinical evaluation.     Inderjit Brian MD    Hand, Upper Extremity & Microvascular Surgery  Department of Orthopaedic Surgery  [facility]      Again, thank you for allowing me to participate in the care of your patient.        Sincerely,        Inderjit Brian MD    Electronically signed

## 2025-03-24 ENCOUNTER — THERAPY VISIT (OUTPATIENT)
Dept: OCCUPATIONAL THERAPY | Facility: REHABILITATION | Age: 25
End: 2025-03-24
Attending: STUDENT IN AN ORGANIZED HEALTH CARE EDUCATION/TRAINING PROGRAM
Payer: COMMERCIAL

## 2025-03-24 DIAGNOSIS — M79.641 PAIN OF RIGHT HAND: Primary | ICD-10-CM

## 2025-03-24 DIAGNOSIS — M25.749 METACARPAL BOSS: ICD-10-CM

## 2025-03-24 DIAGNOSIS — Z47.89 ORTHOPEDIC AFTERCARE: ICD-10-CM

## 2025-03-24 PROCEDURE — 97110 THERAPEUTIC EXERCISES: CPT | Mod: GO | Performed by: OCCUPATIONAL THERAPIST

## 2025-03-24 PROCEDURE — 97165 OT EVAL LOW COMPLEX 30 MIN: CPT | Mod: GO | Performed by: OCCUPATIONAL THERAPIST

## 2025-03-24 PROCEDURE — 97530 THERAPEUTIC ACTIVITIES: CPT | Mod: GO | Performed by: OCCUPATIONAL THERAPIST

## 2025-03-24 NOTE — PROGRESS NOTES
OCCUPATIONAL THERAPY EVALUATION  Type of Visit: Evaluation              Subjective        Presenting condition or subjective complaint: hand therapy  Date of onset: 02/05/25 (Referral)    Relevant medical history: Depression   Dates & types of surgery: 1/24/25 carpal boss removal    Per Dr. Brian 2/28/2025:    Procedure Performed: Carpal boss excision, Right     Date of Surgery: 1/27/2025     Subjective:  Danay Ma is a 25 year old female who returns 4.5 weeks after the above procedure. She still has some tenderness to the surgical site but no more instability of the extensor tendon..  She states her range of motion has improve since last visitBut she has persistent  sensations of tightness in the dorsal aspect of the index finger during terminal flexion.  She denies taking any pain medications.  She has not yet seen hand therapy.    Prior diagnostic imaging/testing results: MRI; X-ray     Prior therapy history for the same diagnosis, illness or injury: No      Prior Level of Function  Transfers: Independent  Ambulation: Independent  ADL: Independent  IADL: Driving, Finances, Housekeeping, Laundry, Meal preparation, Medication management, Work, Yard work    Living Environment  Social support: With a significant other or spouse   Type of home: Apartment/condo   Stairs to enter the home: No       Ramp: No   Stairs inside the home: No       Help at home:    Equipment owned:       Employment: Yes [employer]  Hobbies/Interests: reading, coloring    Patient goals for therapy: open jars       Objective   ADDITIONAL HISTORY:  Right hand dominant  Patient reports symptoms of pain, stiffness/loss of motion, weakness/loss of strength, and edema  Transportation: drives  Currently working in normal job without restrictions    Functional Outcome Measure:   Upper Extremity Functional Index Score:  SCORE:   Column Totals: /80: (Patient-Rptd) 49   (A lower score indicates greater disability.)    PAIN:  Patient  endorses mild soreness (3/10) in the dorsal, radial aspect of the right hand. Especially with opening jars and handwriting, otherwise pain-free.     POSTURE: Normal     EDEMA:  Mild to palpation to the dorsum of the right hand, approximatately 1/2 in circmference from the surgical insions.       SCAR/WOUND: The incision over the right third metacarpal is well-healing free of redness, warmth, or other signs of acute process.  It remains mild to moderately tender to palpation with fairly dense edema immediately beneath.    SENSATION: WNL throughout all nerve distributions; per patient report     ROM:   Hand ROM  Left AROM Right AROM    Index MP  0/64   PIP  0/104   DIP  0/64   Total Active Motion     Long MP     PIP     DIP     Total Active Motion     RING MP     PIP     DIP     Total Active Motion     Small MP     PIP     DIP     Total Active Motion       Mild intrinsic tightness versus extrinsic extensors.     OBSERVATIONS/APPEARANCE:  There is no appreciable subluxation of the EDC on the right with active radial/ulnar deviation.       RESISTED TESTING:  Painful with resisted 1st DI on the right, negative left.       STRENGTH:  NT today.     PALPATION:  Mildly tender to palpation to the incision along the dorsum of the right middle finger. Minimal adhesions to the EDC to palpation and scar retraction.     Assessment & Plan   CLINICAL IMPRESSIONS  Medical Diagnosis: Metacarpal boss    Treatment Diagnosis: Metacarpal boss, pain of right hand, orthopedic aftercare    Impression/Assessment: Pt is a 25 year old female presenting to Occupational Therapy due to metacarpal boss, pain of right hand, orthopedic aftercare.  The following significant findings have been identified: Impaired activity tolerance, Impaired coordination, Impaired ROM, Impaired strength, and Pain.  These identified deficits interfere with their ability to perform self care tasks, work tasks, recreational activities, household chores, driving ,  medication management, financial management,  yard work, care of others, and meal planning and preparation as compared to previous level of function.   Patient's limitations or Problem List includes: Pain, Decreased ROM/motion, Increased edema, Weakness, Sensory disturbance, Adherent scarring, Decreased stability, Hypermobility, Hypomobility, Decreased , Decreased pinch, Decreased coordination, Decreased dexterity, Tightness in musculature, and Adherence in connective tissue of the right wrist, hand, thumb, index finger, long finger, and ring finger which interferes with the patient's ability to perform Self Care Tasks (dressing, eating, bathing, hygiene/toileting), Work Tasks, Sleep Patterns, Recreational Activities, Household Chores, and Driving  as compared to previous level of function.    Clinical Decision Making (Complexity):  Assessment of Occupational Performance: 1-3 Performance Deficits  Occupational Performance Limitations: bathing/showering, toileting, dressing, feeding, hygiene and grooming, care of others, communication management, driving and community mobility, health management and maintenance, home establishment and management, meal preparation and cleanup, shopping, sleep, work, leisure activities, and social participation  Clinical Decision Making (Complexity): Low complexity    PLAN OF CARE  Treatment Interventions:  Modalities:  US  Therapeutic Exercise:  AROM, AAROM, PROM, Tendon Gliding, Blocking, Reverse Blocking, Place and Hold, Contract Relax, Extensor Tracking, Isotonics, Isometrics, and Stabilization  Neuromuscular re-education:  Nerve Gliding, Coordination/Dexterity, Kinesthetic Training, Proprioceptive Training, Posture, Kinesiotaping, Strain Counter Strain, Isometrics, and Stabilization  Manual Techniques:  Coordination/Dexterity, Joint mobilization, Scar mobilization, Friction massage, Myofascial release, and Manual edema mobilization  Orthotic Fabrication:  Static, Dynamic,  Finger based, and Hand based  Self Care:  Self Care Tasks, Ergonomic Considerations, and Work Tasks    Long Term Goals   OT Goal 1  Goal Identifier: Goal I  Goal Description: Patient will complete required ADL, IADL, work sleep, and leisure tasks with mild or less pain and/or difficulty.  (0-1/10)  Rationale: In order to maximize safety and independence with performance of self-care activities;In order to maximize safety and independence with ADL/IADLs  Goal Progress: New  Target Date: 05/19/25      Frequency of Treatment: 1x/week  Duration of Treatment: 8 weeks     Education Assessment: Learner/Method: Patient;No Barriers to Learning;Pictures/Video;Demonstration;Reading;Listening     Risks and benefits of evaluation/treatment have been explained.   Patient/Family/caregiver agrees with Plan of Care.     Evaluation Time:    OT Eval, Low Complexity Minutes (85156): 15    Signing Clinician: Gilberto Arteaga OT                     no chills

## 2025-03-31 ENCOUNTER — THERAPY VISIT (OUTPATIENT)
Dept: OCCUPATIONAL THERAPY | Facility: REHABILITATION | Age: 25
End: 2025-03-31
Attending: STUDENT IN AN ORGANIZED HEALTH CARE EDUCATION/TRAINING PROGRAM
Payer: COMMERCIAL

## 2025-03-31 DIAGNOSIS — Z47.89 ORTHOPEDIC AFTERCARE: ICD-10-CM

## 2025-03-31 DIAGNOSIS — M79.641 PAIN OF RIGHT HAND: ICD-10-CM

## 2025-03-31 DIAGNOSIS — M25.749 METACARPAL BOSS: Primary | ICD-10-CM

## 2025-03-31 PROCEDURE — 97140 MANUAL THERAPY 1/> REGIONS: CPT | Mod: GO | Performed by: OCCUPATIONAL THERAPIST

## 2025-03-31 PROCEDURE — 97530 THERAPEUTIC ACTIVITIES: CPT | Mod: GO | Performed by: OCCUPATIONAL THERAPIST

## 2025-06-04 ENCOUNTER — ALLIED HEALTH/NURSE VISIT (OUTPATIENT)
Dept: FAMILY MEDICINE | Facility: CLINIC | Age: 25
End: 2025-06-04
Payer: COMMERCIAL

## 2025-06-04 VITALS
HEIGHT: 64 IN | DIASTOLIC BLOOD PRESSURE: 68 MMHG | OXYGEN SATURATION: 97 % | BODY MASS INDEX: 22.02 KG/M2 | TEMPERATURE: 98.5 F | SYSTOLIC BLOOD PRESSURE: 112 MMHG | HEART RATE: 88 BPM | WEIGHT: 128.97 LBS | RESPIRATION RATE: 18 BRPM

## 2025-06-04 DIAGNOSIS — J01.00 ACUTE MAXILLARY SINUSITIS, RECURRENCE NOT SPECIFIED: Primary | ICD-10-CM

## 2025-06-04 PROCEDURE — 3074F SYST BP LT 130 MM HG: CPT | Performed by: NURSE PRACTITIONER

## 2025-06-04 PROCEDURE — 3078F DIAST BP <80 MM HG: CPT | Performed by: NURSE PRACTITIONER

## 2025-06-04 PROCEDURE — 99213 OFFICE O/P EST LOW 20 MIN: CPT | Performed by: NURSE PRACTITIONER

## 2025-06-04 RX ORDER — DOXYCYCLINE HYCLATE 100 MG
100 TABLET ORAL 2 TIMES DAILY
Qty: 14 TABLET | Refills: 0 | Status: SHIPPED | OUTPATIENT
Start: 2025-06-04 | End: 2025-06-11

## 2025-06-04 RX ORDER — FLUTICASONE PROPIONATE 50 MCG
1 SPRAY, SUSPENSION (ML) NASAL DAILY
Qty: 18.2 ML | Refills: 0 | Status: SHIPPED | OUTPATIENT
Start: 2025-06-04

## 2025-06-04 RX ORDER — TRIAMCINOLONE ACETONIDE 1 MG/G
CREAM TOPICAL
COMMUNITY
Start: 2025-05-12

## 2025-06-04 NOTE — PROGRESS NOTES
"Assessment & Plan     Sinusitis  - doxycycline hyclate (VIBRA-TABS) 100 MG tablet; Take 1 tablet (100 mg) by mouth 2 times daily for 7 days.  - fluticasone (FLONASE) 50 MCG/ACT nasal spray; Spray 1 spray into both nostrils daily.    Advised the patient to take the medications as prescribed. I told her to run a humidifier as well to help with congestion. Follow-up if symptoms are worsening or not improving over the next three to five days.    I have seen and evaluated patient with the RN. This document represents decision making and discussion with the patient.     Eneida Aguirre NP      Luisito Jon is a 25 year old, presenting for the following health issues:  Sinus Problem      6/4/2025     1:49 PM   Additional Questions   Roomed by CYNTHIA Rosa     Sinus Problem   This is a new problem. The current episode started more than 1 week ago. The problem has not changed since onset.          Objective    /68 (BP Location: Right arm, Patient Position: Sitting, Cuff Size: Adult Regular)   Pulse 88   Temp 98.5  F (36.9  C) (Oral)   Resp 18   Ht 1.613 m (5' 3.5\")   Wt 58.5 kg (128 lb 15.5 oz)   LMP 05/03/2025 (Exact Date)   SpO2 97%   BMI 22.48 kg/m    Body mass index is 22.48 kg/m .  Physical Exam  Constitutional:       Appearance: Normal appearance.   HENT:      Head: Normocephalic.      Right Ear: Hearing normal. A middle ear effusion is present.      Left Ear: Hearing normal. A middle ear effusion is present.      Nose:      Right Sinus: Maxillary sinus tenderness present. No frontal sinus tenderness.      Left Sinus: Maxillary sinus tenderness present. No frontal sinus tenderness.      Mouth/Throat:      Lips: Pink.      Mouth: Mucous membranes are moist.   Eyes:      Pupils: Pupils are equal, round, and reactive to light.   Cardiovascular:      Rate and Rhythm: Normal rate and regular rhythm.      Pulses: Normal pulses.      Heart sounds: Normal heart sounds.   Pulmonary:      Effort: Pulmonary " effort is normal.      Breath sounds: Normal breath sounds.   Skin:     General: Skin is warm and dry.   Neurological:      General: No focal deficit present.      Mental Status: She is alert.   Psychiatric:         Mood and Affect: Mood normal.         Behavior: Behavior normal.             Signed Electronically by: PANDA Campa CNP